# Patient Record
Sex: FEMALE | Race: OTHER
[De-identification: names, ages, dates, MRNs, and addresses within clinical notes are randomized per-mention and may not be internally consistent; named-entity substitution may affect disease eponyms.]

---

## 2017-09-01 ENCOUNTER — HOSPITAL ENCOUNTER (EMERGENCY)
Dept: HOSPITAL 74 - JERFT | Age: 72
Discharge: HOME | End: 2017-09-01
Payer: COMMERCIAL

## 2017-09-01 VITALS — BODY MASS INDEX: 27 KG/M2

## 2017-09-01 VITALS — SYSTOLIC BLOOD PRESSURE: 149 MMHG | DIASTOLIC BLOOD PRESSURE: 79 MMHG | HEART RATE: 68 BPM | TEMPERATURE: 98.4 F

## 2017-09-01 DIAGNOSIS — E78.00: ICD-10-CM

## 2017-09-01 DIAGNOSIS — I10: ICD-10-CM

## 2017-09-01 DIAGNOSIS — J40: Primary | ICD-10-CM

## 2017-09-01 NOTE — PDOC
History of Present Illness





- General


Chief Complaint: Ear Problem


Stated Complaint: SORE THROAT, RT EAR PAIN


Time Seen by Provider: 09/01/17 15:09


History Source: Patient


Exam Limitations: No Limitations





- History of Present Illness


Initial Comments: 


09/01/17 15:09


CHIEF COMPLAINT: Throat pain





HISTORY OF PRESENT ILLNESS: This is a 72 year old female with a history of HTN 

and hypercholesterolemia who presents for evaluation of right ear pain, throat 

pain, dry cough, bilateral eye redness/itching/discharge, and nasal congestion. 

Symptoms started about one week ago after returning from [place]. Her  is 

being seen here with similar symptoms. She denies fevers/chills, chest pain, 

shortness of breath, or any other symptoms.





V/s on arrival are unremarkable.





Past History





- Travel


Traveled outside of the country in the last 30 days: Yes


If so, where?: [facility]





- Past Medical History


Allergies/Adverse Reactions: 


 Allergies











Allergy/AdvReac Type Severity Reaction Status Date / Time


 


naproxen [From Naprosyn] Allergy   Verified 09/01/17 13:59











Home Medications: 


Ambulatory Orders





Aspirin [Aspirin EC] 81 mg PO DAILY 12/06/16 


Atorvastatin Ca [Lipitor] 20 mg PO HS 12/06/16 


Calcium Carbonate/Vitamin D3 [Calcium 600 + Vit D Tablet] 1 each PO DAILY 12/06/ 16 


Cholecalciferol (Vitamin D3) [Vitamin D3 -] 50,000 unit PO DAILY 12/06/16 


Lisinopril [Prinivil] 10 mg PO DAILY 12/06/16 


Metoprolol Succinate [Toprol Xl -] 50 mg PO DAILY 12/06/16 


Omeprazole 20 mg PO PRN PRN 12/06/16 


Azithromycin [Zithromax Tri-Mando (3 DAYS) -] 500 mg PO DAILY #3 tablet 09/01/17 








HTN: Yes


Hypercholesterolemia: Yes


Suicide Attempt (Hx): No





- Surgical History


Abdominal Surgery: Yes


Appendectomy: Yes


Cholecystectomy: Yes





- Psycho/Social/Smoking Cessation Hx


Anxiety: No


Suicidal Ideation: No


Smoking Status: No


Smoking History: Never smoked


Number of Cigarettes Smoked Daily: 0


Information on smoking cessation initiated: No


Hx Alcohol Use: No


Drug/Substance Use Hx: No


Substance Use Type: None





**Review of Systems





- Review of Systems


Constitutional: No: Chills, Fever


HEENTM: Yes: Ear Pain (right), Nose Congestion, Throat Pain


Respiratory: Yes: Cough (dry)


Cardiac (ROS): No: Chest Pain, Edema, Palpitations


ABD/GI: No: Symptoms Reported


: No: Symptoms Reported


Neurological: No: Symptoms reported





*Physical Exam





- Vital Signs


 Last Vital Signs











Temp Pulse Resp BP Pulse Ox


 


 98.4 F   68   17   149/79   97 


 


 09/01/17 13:57  09/01/17 13:57  09/01/17 13:57  09/01/17 13:57  09/01/17 13:57














- Physical Exam


General Appearance: Yes: Nourished, Appropriately Dressed


HEENT: positive: EOMI, Pharyngeal Erythema, Nasal Congestion, Rhinorrhea, Sinus 

Tenderness, TM Dull (right), Other (conjunctivae injected bilaterally)


Respiratory/Chest: positive: Lungs Clear, Normal Breath Sounds


Cardiovascular: positive: Regular Rhythm, Regular Rate


Neurologic: positive: CNs II-XII NML intact





Medical Decision Making





- Medical Decision Making


09/01/17 15:36


A/P: 72 year old female with multiple symptoms including ear pain, throat pain, 

nasal congestion, bilateral eye redness and discharge, and cough.





1. Flu swab


2. Rapid strep


3. Tylenol #3 for pain/cough


4. Sudafed for congestion











*DC/Admit/Observation/Transfer


Diagnosis at time of Disposition: 


 Bronchitis





- Discharge Dispostion


Disposition: HOME


Condition at time of disposition: Stable


Admit: No





- Prescriptions


Prescriptions: 


Azithromycin [Zithromax Tri-Mando (3 DAYS) -] 500 mg PO DAILY #3 tablet





- Patient Instructions


Printed Discharge Instructions:  DI for Acute Bronchitis


Additional Instructions: 


-Take antibiotics as prescribed.


-Use Tobramycin drops: 1 in each eye every 4 hours. Wash sheets and pillowcases 

in hot water. Discard any eye makeup.


-Use over-the-counter Sudafed as needed for congestion and Robitussin as needed 

for cough.


-Follow up with your primary care doctor. Return here for difficulty breathing 

or any other concerning symptoms.

## 2017-09-05 ENCOUNTER — HOSPITAL ENCOUNTER (EMERGENCY)
Dept: HOSPITAL 74 - JERFT | Age: 72
Discharge: HOME | End: 2017-09-05
Payer: COMMERCIAL

## 2017-09-05 VITALS — TEMPERATURE: 98.4 F | SYSTOLIC BLOOD PRESSURE: 145 MMHG | DIASTOLIC BLOOD PRESSURE: 72 MMHG | HEART RATE: 70 BPM

## 2017-09-05 VITALS — BODY MASS INDEX: 26.6 KG/M2

## 2017-09-05 DIAGNOSIS — E78.00: ICD-10-CM

## 2017-09-05 DIAGNOSIS — I10: ICD-10-CM

## 2017-09-05 DIAGNOSIS — J40: Primary | ICD-10-CM

## 2017-09-05 NOTE — PDOC
History of Present Illness





- General


Chief Complaint: Cold Symptoms


Stated Complaint: cough, wants new prescription


Time Seen by Provider: 09/05/17 09:52


History Source: Patient


Exam Limitations: No Limitations





- History of Present Illness


Initial Comments: 


09/05/17 10:50


Patient is a 72-year-old female, history of hypertension, was seen in the 

emergency department on 09/01/2017 for sore throat and cough patient was given 

a azithromycin 500 mg by mouth for 3 days with robitussin, states that symptoms 

are still persisting unable to sleep at night because of cough. Reports that 

symptoms started after returning from hospitals,  Cough is productive with green 

sputum. Patient denies any fever, no nausea vomiting, no constipation or 

diarrhea.  with similar symptoms.  





Past Medical History: Denies.  





Allergies: No known allergies





Medications: Azithromycin





Family History: Non-contributory





Social History: Denies smoking, alcohol use, or IVDU





Review of Systems


GENERAL/CONSTITUTIONAL: No fever or chills. No weakness. No weight change.


HEAD, EYES, EARS, NOSE AND THROAT: No change in vision. No ear pain or 

discharge. No sore throat. 


CARDIOVASCULAR: No chest pain or shortness of breath.


RESPIRATORY: +Cough, no wheezing, or hemoptysis.


GASTROINTESTINAL: No nausea, vomiting, diarrhea or constipation. No rectal 

bleeding.


GENITOURINARY: No dysuria, frequency, or change in urination.


MUSCULOSKELETAL: No joint or muscle swelling or pain. No neck or back pain.


SKIN : No rash or easy bruising.


NEUROLOGIC: No headache, vertigo, loss of consciousness, or loss of sensation.


PSYCHIATRIC: No depression or anxiety.


ENDOCRINE: No increased thirst. No abnormal weight change.


HEMATOLOGIC/LYMPHATIC: No anemia, easy bleeding, or history of blood clots.


ALLERGIC/IMMUNOLOGIC: No hives or skin allergy. No latex allergy.





Physical Exam: 


GENERAL: The patient is awake, alert, and fully oriented, in no acute distress.


EYES: Pupils equal, round and reactive to light, extraocular movements intact, 

sclera anicteric, conjunctiva clear.


ENT: Ears normal, nares patent, oropharynx clear without exudates.  Moist 

mucous membranes. No uvula deviation


NECK: Normal range of motion, supple without lymphadenopathy, JVD, or masses.


LUNGS: Breath sounds equal, clear to auscultation bilaterally.   No wheezes, 

and no crackles.


HEART: Regular rate and rhythm, normal S1 and S2 without murmur, rub or gallop.


ABDOMEN: Soft, nontender, normoactive bowel sounds.  No guarding, no rebound.  

No masses. No bruising or abrasions


MUSCULOSKELETAL: Normal range of motion, no edema.  No clubbing or cyanosis. No 

cords, erythema, or tenderness. No CVA Tenderness with fist.


NEUROLOGICAL: Cranial nerves II through XII grossly intact.  Normal speech, 

normal gait.


SKIN: Warm, Dry, normal turgor, no rashes or lesions noted.














Past History





- Past Medical History


Allergies/Adverse Reactions: 


 Allergies











Allergy/AdvReac Type Severity Reaction Status Date / Time


 


naproxen [From Naprosyn] Allergy   Verified 09/05/17 09:35











Home Medications: 


Ambulatory Orders





Aspirin [Aspirin EC] 81 mg PO DAILY 12/06/16 


Atorvastatin Ca [Lipitor] 20 mg PO HS 12/06/16 


Calcium Carbonate/Vitamin D3 [Calcium 600 + Vit D Tablet] 1 each PO DAILY 12/06/ 16 


Cholecalciferol (Vitamin D3) [Vitamin D3 -] 50,000 unit PO DAILY 12/06/16 


Lisinopril [Prinivil] 10 mg PO DAILY 12/06/16 


Metoprolol Succinate [Toprol Xl -] 50 mg PO DAILY 12/06/16 


Omeprazole 20 mg PO PRN PRN 12/06/16 


Azithromycin [Zithromax Tri-Mando (3 DAYS) -] 500 mg PO DAILY #3 tablet 09/01/17 








HTN: Yes


Hypercholesterolemia: Yes


Suicide Attempt (Hx): No





- Surgical History


Abdominal Surgery: Yes


Appendectomy: Yes


Cholecystectomy: Yes





- Psycho/Social/Smoking Cessation Hx


Anxiety: No


Suicidal Ideation: No


Smoking Status: No


Smoking History: Never smoked


Number of Cigarettes Smoked Daily: 0


Information on smoking cessation initiated: No


Hx Alcohol Use: No


Drug/Substance Use Hx: No


Substance Use Type: None





*Physical Exam





- Vital Signs


 Last Vital Signs











Temp Pulse Resp BP Pulse Ox


 


 98.4 F   70   18   145/72   99 


 


 09/05/17 09:33  09/05/17 09:33  09/05/17 09:33  09/05/17 09:33  09/05/17 09:33














ED Treatment Course





- RADIOLOGY


Radiology Studies Ordered: 














 Category Date Time Status


 


 CHEST PA & LAT [RAD] Stat Radiology  09/05/17 10:11 Completed














Medical Decision Making





- Medical Decision Making


09/05/17 10:59


A/P: Patient with symptoms of viral illness, will DC patient home to continue 

over-the-counter cough medicine follow-up with PMD after 7 days of initial 

onset. Chest x-ray was performed and is negative for pneumonia. Instructions 

given to patient, she verbalized understanding will follow-up as instructed if 

symptoms persist.





I discussed the physical exam findings, ancillary test results and final 

diagnoses with the patient. I answered all of the patient's questions.


The patient was satisfied with the care received and felt comfortable with the 

discharge plan and treatment plan. 


The patient will call to arrange follow-up and will return to the Emergency 

Department with any new, persistent or worsening symptoms. 








*DC/Admit/Observation/Transfer


Diagnosis at time of Disposition: 


 Bronchitis





- Discharge Dispostion


Disposition: HOME


Condition at time of disposition: Good


Admit: No





- Referrals


Referrals: 


Quinn Morales [Primary Care Provider] - 





- Patient Instructions


Printed Discharge Instructions:  DI for Viral Upper Respiratory Infection -- 

Adult


Additional Instructions: 


Keep head of bed elevated 45 when sleeping


Cough medicine


Cool air humidifier


Followup in the primary care doctor's office if symptoms persist after 7 days.


If any respiratory distress, increased cough, inability to drink, increased 

wheezing please return immediately to emergency department.

## 2017-09-08 ENCOUNTER — HOSPITAL ENCOUNTER (EMERGENCY)
Dept: HOSPITAL 74 - JERFT | Age: 72
Discharge: HOME | End: 2017-09-08
Payer: COMMERCIAL

## 2017-09-08 VITALS — DIASTOLIC BLOOD PRESSURE: 68 MMHG | TEMPERATURE: 98.1 F | HEART RATE: 71 BPM | SYSTOLIC BLOOD PRESSURE: 117 MMHG

## 2017-09-08 VITALS — BODY MASS INDEX: 26.6 KG/M2

## 2017-09-08 DIAGNOSIS — R05: Primary | ICD-10-CM

## 2017-09-08 DIAGNOSIS — E78.00: ICD-10-CM

## 2017-09-08 NOTE — PDOC
History of Present Illness





- General


Chief Complaint: RX Refill


Stated Complaint: RX REFILL


Time Seen by Provider: 09/08/17 13:04


History Source: Patient


Exam Limitations: No Limitations





- History of Present Illness


Initial Comments: 





09/08/17 13:09


This is a 72-year-old woman with past medical history of hypercholesterolemia 

and hypertension who presents today with continued dry productive cough with 

yellow sputum. Her cough is worse in the morning and resolves throughout the 

day. She has completed the Z-Mando without any relief of cough or symptoms. She 

has an appointment with her primary doctor in October. Taking promethazine 

cough syrup with good relief. She is requesting a prescription for Phenergan 

cough syrup until she can see her primary doctor. She denies fevers, chest pain

, shortness of breath, nausea, vomiting, dizziness.


Tob- denies


ETOH- denies


Illicits- denies


Timing/Duration: reports: other





Past History





- Past Medical History


Allergies/Adverse Reactions: 


 Allergies











Allergy/AdvReac Type Severity Reaction Status Date / Time


 


naproxen [From Naprosyn] Allergy   Verified 09/08/17 12:24











Home Medications: 


Ambulatory Orders





Aspirin [Aspirin EC] 81 mg PO DAILY 12/06/16 


Atorvastatin Ca [Lipitor] 20 mg PO HS 12/06/16 


Calcium Carbonate/Vitamin D3 [Calcium 600 + Vit D Tablet] 1 each PO DAILY 12/06/ 16 


Cholecalciferol (Vitamin D3) [Vitamin D3 -] 50,000 unit PO DAILY 12/06/16 


Lisinopril [Prinivil] 10 mg PO DAILY 12/06/16 


Metoprolol Succinate [Toprol Xl -] 50 mg PO DAILY 12/06/16 


Omeprazole 20 mg PO PRN PRN 12/06/16 


Phenylephrine HCl/Prometh HCl [Promethazine Vc Syrup] 10 ml PO BID #400 syrup 09 /08/17 








HTN: Yes


Hypercholesterolemia: Yes


Suicide Attempt (Hx): No





- Surgical History


Abdominal Surgery: Yes


Appendectomy: Yes


Cholecystectomy: Yes





- Psycho/Social/Smoking Cessation Hx


Anxiety: No


Suicidal Ideation: No


Smoking Status: No


Smoking History: Never smoked


Number of Cigarettes Smoked Daily: 0


Information on smoking cessation initiated: No


Hx Alcohol Use: No


Drug/Substance Use Hx: No


Substance Use Type: None





**Review of Systems





- Review of Systems


Able to Perform ROS?: Yes


Is the patient limited English proficient: No


Constitutional: No: Symptoms Reported


HEENTM: No: Symptoms Reported


Respiratory: Yes: See HPI


Cardiac (ROS): No: Symptoms Reported


ABD/GI: No: Symptoms Reported


: No: Symptoms Reported


Musculoskeletal: No: Symptoms Reported


Integumentary: No: Symptoms Reported


Neurological: No: Symptoms reported





*Physical Exam





- Vital Signs


 Last Vital Signs











Temp Pulse Resp BP Pulse Ox


 


 98.1 F   71   19   117/68   99 


 


 09/08/17 12:22  09/08/17 12:22 09/08/17 12:22 09/08/17 12:22 09/08/17 12:22














- Physical Exam


General Appearance: Yes: Appropriately Dressed.  No: Apparent Distress


HEENT: positive: EOMI, CAMACHO, Normal ENT Inspection


Neck: positive: Trachea midline.  negative: Supple


Respiratory/Chest: positive: Lungs Clear, Normal Breath Sounds.  negative: 

Chest Tender, Respiratory Distress, Accessory Muscle Use


Cardiovascular: positive: Regular Rhythm, Regular Rate, S1, S2


Gastrointestinal/Abdominal: positive: Normal Bowel Sounds, Soft.  negative: 

Tender


Musculoskeletal: positive: Normal Inspection.  negative: CVA Tenderness


Extremity: positive: Normal Capillary Refill


Integumentary: positive: Normal Color, Dry, Warm


Neurologic: positive: CNs II-XII NML intact, Fully Oriented, Alert, Normal Mood/

Affect, Normal Response, Motor Strength 5/5





Medical Decision Making





- Medical Decision Making





09/08/17 13:15


A:


This is a 72-year-old woman with past medical history of hypercholesterolemia 

and hypertension who presents today with continued dry productive cough with 

yellow sputum. Her cough is worse in the morning and resolves throughout the 

day. She has completed the Z-Mando without any relief of cough or symptoms. She 

has an appointment with her primary doctor in October. Taking promethazine 

cough syrup with good relief. She is requesting a prescription for Phenergan 

cough syrup until she can see her primary doctor. She denies fevers, chest pain

, shortness of breath, nausea, vomiting, dizziness. She is refusing chest x-ray 

and complete workup. Lungs clear to auscultation bilaterally speaking full 

sentences no wheezes.





P:


Will prescribe phenergan syrup with strict follow-up and return instructions.


I discussed the physical exam findings, ancillary test results and final 

diagnoses with the patient. I answered all of the patient's questions.


The patient was satisfied with the care received and felt comfortable with the 

discharge plan and treatment plan. 


The patient will call her primary doctor within 72 hours to arrange follow-up 

and will return to the Emergency Department with any new, persistant or 

worsening symptoms. 





*DC/Admit/Observation/Transfer


Diagnosis at time of Disposition: 


 Cough in adult





- Discharge Dispostion


Disposition: HOME


Condition at time of disposition: Stable


Admit: No





- Prescriptions


Prescriptions: 


Phenylephrine HCl/Prometh HCl [Promethazine Vc Syrup] 10 ml PO BID #400 syrup





- Patient Instructions


Printed Discharge Instructions:  DI for Cough -- Adult

## 2018-05-18 ENCOUNTER — HOSPITAL ENCOUNTER (EMERGENCY)
Dept: HOSPITAL 74 - JERFT | Age: 73
Discharge: HOME | End: 2018-05-18
Payer: COMMERCIAL

## 2018-05-18 VITALS — TEMPERATURE: 97.8 F

## 2018-05-18 VITALS — DIASTOLIC BLOOD PRESSURE: 70 MMHG | SYSTOLIC BLOOD PRESSURE: 131 MMHG | HEART RATE: 84 BPM

## 2018-05-18 DIAGNOSIS — I10: ICD-10-CM

## 2018-05-18 DIAGNOSIS — E78.00: ICD-10-CM

## 2018-05-18 DIAGNOSIS — B34.9: Primary | ICD-10-CM

## 2018-05-18 NOTE — PDOC
History of Present Illness





- General


Chief Complaint: Cold Symptoms


Stated Complaint: COLD LIKE SYMPTOMS


Time Seen by Provider: 05/18/18 11:46


History Source: Patient





- History of Present Illness


Timing/Duration: reports: yesterday


Severity: reports: moderate


Associated Symptoms: reports: cough, earache, fever/chills.  denies: muscle 

aches





Past History





- Past Medical History


Allergies/Adverse Reactions: 


 Allergies











Allergy/AdvReac Type Severity Reaction Status Date / Time


 


naproxen [From Naprosyn] Allergy   Verified 05/18/18 10:48











Home Medications: 


Ambulatory Orders





Aspirin [Aspirin EC] 81 mg PO DAILY 12/06/16 


Atorvastatin Ca [Lipitor] 20 mg PO HS 12/06/16 


Calcium Carbonate/Vitamin D3 [Calcium 600 + Vit D Tablet] 1 each PO DAILY 12/06/ 16 


Cholecalciferol (Vitamin D3) [Vitamin D3 -] 50,000 unit PO DAILY 12/06/16 


Lisinopril [Prinivil] 10 mg PO DAILY 12/06/16 


Metoprolol Succinate [Toprol Xl -] 50 mg PO DAILY 12/06/16 


Omeprazole 20 mg PO PRN PRN 12/06/16 


Phenylephrine HCl/Prometh HCl [Promethazine Vc Syrup] 10 ml PO BID #400 syrup 09 /08/17 








HTN: Yes


Hypercholesterolemia: Yes





- Surgical History


Abdominal Surgery: Yes


Appendectomy: Yes


Cholecystectomy: Yes





- Suicide/Smoking/Psychosocial Hx


Smoking Status: No


Smoking History: Never smoked


Number of Cigarettes Smoked Daily: 0


Hx Alcohol Use: No


Drug/Substance Use Hx: No


Substance Use Type: None





**Review of Systems





- Review of Systems


Constitutional: Yes: Chills, Malaise.  No: Fever


HEENTM: Yes: Ear Pain.  No: Throat Pain


Respiratory: Yes: Cough.  No: Shortness of Breath


Cardiac (ROS): No: Chest Pain


ABD/GI: No: Diarrhea, Nausea, Vomiting





*Physical Exam





- Vital Signs


 Last Vital Signs











Temp Pulse Resp BP Pulse Ox


 


 97.8 F   101 H  28 H  88/59   99 


 


 05/18/18 11:38  05/18/18 11:38  05/18/18 11:38  05/18/18 11:38  05/18/18 11:38














- Physical Exam


General Appearance: Yes: Appropriately Dressed.  No: Apparent Distress


HEENT: positive: Normal Voice


Neck: positive: Supple


Respiratory/Chest: positive: Lungs Clear, Normal Breath Sounds.  negative: 

Respiratory Distress


Cardiovascular: positive: Regular Rate, S1, S2


Integumentary: positive: Dry, Warm


Neurologic: positive: Fully Oriented, Alert, Normal Mood/Affect





ED Treatment Course





- RADIOLOGY


Radiology Studies Ordered: 














 Category Date Time Status


 


 CHEST PA & LAT [RAD] Stat Radiology  05/18/18 11:47 Ordered














Medical Decision Making





- Medical Decision Making





05/18/18 11:54


72 yo F, denies any past medical history here with malaise with productive 

cough with right ear pain since last night.  + chills, no fever, sore throat, 

shortness of breath, chest pain, nausea, vomiting or diarrhea.  No known sick 

contacts.  No recent travel.  Patient tachypneic to 28 and mildly tachycardic 

to 101 at triage, but appears comfortable and in no apparent distress in ER 

with clear chest, lungs.  Suspect viral etiology at this time but will get 

chest x-ray. Rpt vitals








05/18/18 12:41


CXR negative for acute pathology. Rpt vitals wnl with no intervention. Stable 

for dc w/ supportive tx and pmd f/u as needed





*DC/Admit/Observation/Transfer


Diagnosis at time of Disposition: 


 Viral syndrome








- Discharge Dispostion


Disposition: HOME


Condition at time of disposition: Good





- Referrals





- Patient Instructions


Printed Discharge Instructions:  DI for Viral Syndrome


Additional Instructions: 


Your cxr was negative for any infection.  The cause of your symptoms is most 

likely viral.  Rest and plenty of fluids and take Tylenol as needed for pain





- Post Discharge Activity

## 2018-07-15 ENCOUNTER — HOSPITAL ENCOUNTER (EMERGENCY)
Dept: HOSPITAL 74 - JER | Age: 73
Discharge: HOME | End: 2018-07-15
Payer: COMMERCIAL

## 2018-07-15 VITALS — BODY MASS INDEX: 44.4 KG/M2

## 2018-07-15 VITALS — TEMPERATURE: 98.2 F | SYSTOLIC BLOOD PRESSURE: 146 MMHG | HEART RATE: 56 BPM | DIASTOLIC BLOOD PRESSURE: 60 MMHG

## 2018-07-15 DIAGNOSIS — M25.552: Primary | ICD-10-CM

## 2018-07-15 NOTE — PDOC
History of Present Illness





- General


Chief Complaint: Pain


Stated Complaint: PAIN


Time Seen by Provider: 07/15/18 17:20


History Source: Patient, Spouse


Exam Limitations: Language Barrier





- History of Present Illness


Initial Comments: 





07/15/18 17:37


HPI:


*Patient's  translated


Patient is a 73 year old  female with no significant PMH presenting to 

ED [facility] and for L hip pain radiating 

down to the calf. Patient states the pain started 1 month ago when she was in 

[facility]. Patient returned yesterday. Pain is worse when she walks, gets better 

with rest. She was given medications for the pain which does help, but the pain 

has not gone away. Patient rates the pain 6/10. She feels weaker on the L side 

compared to the R. She denies injury, fever, chills, SOB, chest pain, abdominal 

pain, N/V/D, numbness/tingling, swelling. Denies history of blood clots





PMH: As per HPI.


PSH: None


Meds:


Allergies: Naproxen











Past History





- Travel


If so, where?: [facility]





- Past Medical History


Allergies/Adverse Reactions: 


 Allergies











Allergy/AdvReac Type Severity Reaction Status Date / Time


 


naproxen [From Naprosyn] Allergy   Verified 07/15/18 17:00











Home Medications: 


Ambulatory Orders





NK [No Known Home Medication]  07/15/18 








HTN: Yes


Hypercholesterolemia: Yes





- Surgical History


Abdominal Surgery: Yes


Appendectomy: Yes


Cholecystectomy: Yes





- Suicide/Smoking/Psychosocial Hx


Smoking Status: No


Smoking History: Never smoked


Have you smoked in the past 12 months: No


Number of Cigarettes Smoked Daily: 0


Information on smoking cessation initiated: No


Hx Alcohol Use: No


Drug/Substance Use Hx: No


Substance Use Type: None





**Review of Systems





- Review of Systems


Able to Perform ROS?: Yes


Comments:: 





07/15/18 17:45


*Patient's  translated


ROS:


Constitutional: No fevers, chills, fatigue, malaise


HEENT: No Rhinorrhea, nasal congestion, visual changes


Cardiovascular: No chest pain, syncope, palpitations, lightheadedness


Respiratory: No Cough, SOB, Hemoptysis,


Gastrointestinal: No Abdominal pain, Nausea, Vomiting, Constipation, Diarrhea, 

Melena


Genitourinary: No Dysuria


Musculoskeletal: L hip pain radiating down to calf


Neurologic: L leg weakness. No Headache, Dizziness, Numbness, Tingling


07/15/18 18:16








*Physical Exam





- Vital Signs


 Last Vital Signs











Temp Pulse Resp BP Pulse Ox


 


 98.7 F   61   18   130/86   98 


 


 07/15/18 16:59  07/15/18 16:59  07/15/18 16:59  07/15/18 16:59  07/15/18 16:59














- Physical Exam


Comments: 





07/15/18 17:50


Physical Exam:


General Appearance: Patient sitting comfortably in chair. No Apparent Distress


HEENT: EOMI, CAMACHO. No Pharyngeal Erythema, Tonsillar Exudate, Tonsillar Erythema


Respiratory/Chest: Lungs Clear, Normal Breath Sounds. No Crackles, Rales, 

Rhonchi, Wheezing


Cardiovascular: Regular Rhythm, Regular Rate, Peripheral pulses palpable 

bilaterally. No JVD, Murmur, Gallops, Rubs


Gastrointestinal/Abdominal: Normal Bowel Sounds, Soft. No Guarding, Rebound, 

Tenderness


Musculoskeletal: L hip pain upon palpation of ischial spine. No left leg 

tenderness, no joint swelling, no calf erythema or edema. No leg length 

discrepancy. No CVA Tenderness


Extremity: Normal Capillary Refill


Integumentary: Normal Color, Dry, Warm


Neurologic: CNs II-XII NML intact, Fully Oriented, Alert, Normal Mood/Affect, 

Normal Response, Motor Strength 5/5. Patellar reflex intact. Negative straight 

leg raise on R and L side. 





07/15/18 18:25








Medical Decision Making





- Medical Decision Making





07/15/18 18:22


Patient is a 73 year old female with no significant PMH presenting to ED with L 

hip pain radiating down to the L calf x 1month. No injury, numbness, tingling, 

swelling, erythema, muscle tenderness. Hip is tender. 





DDX: osteonecrosis, occult hip fracture, rchanteric bursitis, sciatica, 





Patient is afebrile, normotensive and HR is wnl. No concern for infectious 

process.





Most likely discharge home in stable condition. Xray results pending.


Patient signed out to Dr. Davenport





07/15/18 18:56








*DC/Admit/Observation/Transfer


Diagnosis at time of Disposition: 


Hip pain


Qualifiers:


 Laterality: left Qualified Code(s): M25.552 - Pain in left hip








- Discharge Dispostion


Disposition: HOME


Condition at time of disposition: Stable





- Referrals


Referrals: 


Bianca Uribe MD [Primary Care Provider] - 





- Patient Instructions


Additional Instructions: 


You were seen here today for left hip pain. We gave you Tylenol and a Lidoderm 

patch in the emergency department to help with pain. You can keep taking 

Tylenol for pain control. 


The Xray of your hip showed PENDING


Please come back to the emergency department if your pain gets worse, if you 

fall, or if you develop a new symptom. 


Please follow up with your primary care doctor.


Thank you





- Post Discharge Activity

## 2018-07-15 NOTE — PDOC
*Physical Exam





- Vital Signs


 Last Vital Signs











Temp Pulse Resp BP Pulse Ox


 


 98.7 F   61   18   130/86   98 


 


 07/15/18 16:59  07/15/18 16:59  07/15/18 16:59  07/15/18 16:59  07/15/18 16:59














ED Treatment Course





- Medications


Given in the ED: 


ED Medications














Discontinued Medications














Generic Name Dose Route Start Last Admin





  Trade Name Zaida  PRN Reason Stop Dose Admin


 


Acetaminophen  0 mg  07/15/18 18:11  07/15/18 18:26





  Ofirmev Injection -  IVPB  07/15/18 18:12  Not Given





  ONCE ONE   





     





     





     





     


 


Acetaminophen  500 mg  07/15/18 18:19  07/15/18 18:27





  Tylenol -  PO  07/15/18 18:20  Not Given





  ONCE ONE   





     





     





     





     


 


Acetaminophen  650 mg  07/15/18 18:20  07/15/18 18:26





  Tylenol -  PO  07/15/18 18:21  650 mg





  ONCE ONE   Administration





     





     





     





     














Medical Decision Making





- Medical Decision Making


Pt signed out by Dr. Barnes during shift change.


You were seen here today for left hip pain. We gave you Tylenol and a Lidoderm 

patch in the emergency department to help with pain. You can keep taking 

Tylenol for pain control. 


The Xray of your hip was negative as read by Dr. Townsend.


Please come back to the emergency department if your pain gets worse, if you 

fall, or if you develop a new symptom. 


Please follow up with your primary care doctor.


Thank you


07/15/18 18:58


Pt x-ray read as negative by Dr. Townsend. Possible calcification near R iliac 

crest. Pt can follow-up with orthopedics if concerned. 


07/15/18 20:59








*DC/Admit/Observation/Transfer


Diagnosis at time of Disposition: 


Hip pain


Qualifiers:


 Laterality: left Qualified Code(s): M25.552 - Pain in left hip








- Discharge Dispostion


Disposition: HOME


Condition at time of disposition: Stable


Decision to Admit order: No





- Referrals


Referrals: 


Bianca Uribe MD [Primary Care Provider] - 


Ernie Kent MD [Staff Physician] - 





- Patient Instructions


Additional Instructions: 


You were seen here today for left hip pain. We gave you Tylenol and a Lidoderm 

patch in the emergency department to help with pain. You can keep taking 

Tylenol for pain control. 


The Xray of your hip did not show any fractures. 


Please come back to the emergency department if your pain gets worse, if you 

fall, or if you develop a new symptom. 


Please follow up with your primary care doctor and the orthopedic clinic if 

pain persists.


Thank you





- Post Discharge Activity

## 2019-01-06 ENCOUNTER — HOSPITAL ENCOUNTER (EMERGENCY)
Dept: HOSPITAL 74 - JERFT | Age: 74
Discharge: HOME | End: 2019-01-06
Payer: COMMERCIAL

## 2019-01-06 VITALS — SYSTOLIC BLOOD PRESSURE: 157 MMHG | DIASTOLIC BLOOD PRESSURE: 75 MMHG | HEART RATE: 72 BPM | TEMPERATURE: 97.8 F

## 2019-01-06 VITALS — BODY MASS INDEX: 27.6 KG/M2

## 2019-01-06 DIAGNOSIS — E78.00: ICD-10-CM

## 2019-01-06 DIAGNOSIS — I10: ICD-10-CM

## 2019-01-06 DIAGNOSIS — M54.42: Primary | ICD-10-CM

## 2019-01-06 NOTE — PDOC
History of Present Illness





- General


Chief Complaint: Back Pain


Stated Complaint: PAIN


Time Seen by Provider: 01/06/19 08:30


History Source: Patient


Exam Limitations: No Limitations





- History of Present Illness


Initial Comments: 





01/06/19 08:58


Pt is a 72 y/o F who presents to the ED with low back pain radiating down to 

her L leg for the past 3 weeks. Pt states she has a hx of sciatica with her 

last flare in 7/18. She states that her symptoms came on after walking. Her 

 tried to massage her spasm, and she states that pain got worse after 

that. She is not taking any medication at home for her pain. Denies fevers, 

chills, pain with urination, frequency, urgency, hematuria, nausea, vomiting, 

weakness to the extremities, numbness to the extremities, bladder/bowel 

incontinence and saddle anesthesia. 





Past History





- Travel


Traveled outside of the country in the last 30 days: No


Close contact w/someone who was outside of country & ill: No





- Past Medical History


Allergies/Adverse Reactions: 


 Allergies











Allergy/AdvReac Type Severity Reaction Status Date / Time


 


naproxen [From Naprosyn] Allergy   Verified 01/06/19 08:25











Home Medications: 


Ambulatory Orders





Cyclobenzaprine HCl 5 mg PO HS #10 tablet 01/06/19 


Lidocaine 5% Patch [Lidoderm -] 1 patch TP DAILY #7 patch 01/06/19 


Methylprednisolone [Medrol Dose Mando] 4 mg PO ASDIR #21 tablet 01/06/19 








COPD: No


HTN: Yes


Hypercholesterolemia: Yes





- Surgical History


Abdominal Surgery: Yes


Appendectomy: Yes


Cholecystectomy: Yes





- Suicide/Smoking/Psychosocial Hx


Smoking Status: No


Smoking History: Never smoked


Have you smoked in the past 12 months: No


Number of Cigarettes Smoked Daily: 0


Information on smoking cessation initiated: No


Hx Alcohol Use: No


Drug/Substance Use Hx: No


Substance Use Type: None





**Review of Systems





- Review of Systems


Able to Perform ROS?: Yes


Comments:: 





01/06/19 08:57


CONSTITUTIONAL: 


Absent: fever, chills, diaphoresis, generalized weakness, malaise, loss of 

appetite


GASTROINTESTINAL:


Absent: abdominal pain, abdominal distension, nausea, vomiting, diarrhea, 

constipation, melena, hematochezia


GENITOURINARY: 


Absent: dysuria, frequency, urgency, hesitancy, hematuria, flank pain, genital 

pain


MUSCULOSKELETAL: 


Present: low back pain Absent: arthralgia, joint swelling


SKIN: 


Absent: rash, itching, pallor


NEUROLOGIC: 


Absent: headache, focal weakness or paresthesias, dizziness, unsteady gait, 

seizure, mental status changes, bladder or bowel incontinence


PSYCHIATRIC: 


Absent: anxiety, depression, suicidal or homicidal ideation, hallucinations.


Is the patient limited English proficient: No





*Physical Exam





- Vital Signs


 Last Vital Signs











Temp Pulse Resp BP Pulse Ox


 


 97.8 F   72   18   157/75   96 


 


 01/06/19 08:26  01/06/19 08:26  01/06/19 08:26  01/06/19 08:26  01/06/19 08:26














- Physical Exam


Comments: 





01/06/19 08:57


GENERAL:


Well developed, well nourished. Awake and alert. No acute distress.


NECK: 


Supple. Full ROM. No JVD. Carotid pulses 2+ and symmetric, without bruits. No 

thyromegaly. No lymphadenopathy.


MUSCULOSKELETAL 


TTP of the L paraspinous muscles, L2-S1, with palpable knot consistent with 

muscle spasm. (+) straight leg raise on the left. Decreased ROM of the back d.t 

pain. No midline tenderness. Calves and thighs are soft and with out pain. 

Normal range of motion at all other joints. No bony deformities or tenderness. 

No CVA tenderness.


EXTREMITIES: 


No cyanosis. No clubbing. No edema. No calf tenderness.


SKIN: 


Warm and dry. Normal capillary refill. No rashes. No jaundice. 


NEUROLOGICAL: 


Alert, awake, appropriate. Cranial nerves 2-12 intact. No deficits to light 

touch and temperature in face, upper extremities and lower extremities. No 

motor deficits in the in face, upper extremities and lower extremities. 

Normoreflexic in the upper and lower extremities. Normal speech. Toes are down-

going bilaterally. Gait is normal without ataxia.


PSYCHIATRIC: 


Cooperative. Good eye contact. Appropriate mood and affect.








Moderate Sedation





- Procedure Monitoring


Vital Signs: 


Procedure Monitoring Vital Signs











Temperature  97.8 F   01/06/19 08:26


 


Pulse Rate  72   01/06/19 08:26


 


Respiratory Rate  18   01/06/19 08:26


 


Blood Pressure  157/75   01/06/19 08:26


 


O2 Sat by Pulse Oximetry (%)  96   01/06/19 08:26











Medical Decision Making





- Medical Decision Making





01/06/19 08:57


Pt is a 72 y/o F who presents to the ED with low back pain radiating down to 

her leg for the past 3 weeks. Pt states she has a hx of sciatica. 





-Pt with TTP of the L paraspinous muscles, L2-S1, with palpable knot consistent 

with muscle spasm. (+) straight leg raise on the left.


-No trauma, or fever. No saddle anesthesia or bladder/bowel incontinence. No 

CVA tenderness or rash.


-Pt is neurologically intact on exam with no focal findings. 


-Tramadol given with relief of symptoms


-DC home. Ortho follow up given for if symptoms do not resolve.


-I discussed the physical exam findings, ancillary test results and final 

diagnoses with the patient. I answered all of the patient's questions. The 

patient was satisfied with the care received and felt comfortable with the 

discharge plan and treatment plan.  The Patient agrees to follow up with the 

primary care physician/specialist within 24-72 hours. Return precautions were 

given.








*DC/Admit/Observation/Transfer


Diagnosis at time of Disposition: 


Left low back pain


Qualifiers:


 Chronicity: acute Sciatica presence: with sciatica Sciatica laterality: 

sciatica of left side Qualified Code(s): M54.42 - Lumbago with sciatica, left 

side








- Discharge Dispostion


Disposition: HOME


Condition at time of disposition: Stable


Decision to Admit order: No





- Prescriptions


Prescriptions: 


Cyclobenzaprine HCl 5 mg PO HS #10 tablet


Methylprednisolone [Medrol Dose Mando] 4 mg PO ASDIR #21 tablet





- Referrals


Referrals: 


Bianca Uribe MD [Primary Care Provider] - 


Jluis Palacios MD, FAANS [Staff Physician] - 





- Patient Instructions


Printed Discharge Instructions:  DI for Back Pain With Sciatica


Additional Instructions: 


You have low back pain and sciatica due to a muscle spasm. 


Please take the prednisone as directed.


You were also prescribed Flexeril. Please take this medication every 8 hours 

for the first day. Then take the medication before you go to bed. Do not drive 

after taking this medication as it may make you sleepy. 


You may use warm compresses on your back to help with your symptoms. 


Please follow-up with your primary care doctor. 


If your symptoms do not resolve in 3-5 days, follow-up with orthopedics. A 

referral has been provided for you.





Return to the emergency department if you have worsening back pain, bladder or 

bowel incontinence, numbness and tingling in her legs, changes in the way you 

walk, or any new or worsening symptoms.





Tiene dolor lumbar y citica debido a un espasmo muscular.


Por favor tome la prednisona segn las indicaciones.


Tambin te recetaron Flexeril. [place] [facility] medicamento cada 8 horas jaymie el 

primer da. Luego tome el medicamento antes de irse a la cama. No maneje 

despus de [facility] medicamento, ya que puede causarle sueo.


Puede usar compresas tibias en la espalda para ayudar con annette sntomas.


Por favor ben un seguimiento con ayala mdico de atencin primaria.


Si annette sntomas no se resuelven en 3-5 olivarez, ben un seguimiento con ortopedia. 

Se ha proporcionado tegan referencia para usted.





Regrese a la marcie de emergencias si tiene empeoramiento del dolor de espalda, 

incontinencia de la vejiga o el intestino, entumecimiento y hormigueo en las 

piernas, cambios en la forma en que camina, o cualquier sntoma nuevo o que 

empeora.


Print Language: [place]





- Post Discharge Activity

## 2021-04-07 ENCOUNTER — HOSPITAL ENCOUNTER (OUTPATIENT)
Dept: HOSPITAL 74 - JRADIR | Age: 76
Discharge: HOME | End: 2021-04-07
Attending: INTERNAL MEDICINE
Payer: COMMERCIAL

## 2021-04-07 DIAGNOSIS — E04.1: Primary | ICD-10-CM

## 2021-04-07 PROCEDURE — 0G9K3ZX DRAINAGE OF THYROID GLAND, PERCUTANEOUS APPROACH, DIAGNOSTIC: ICD-10-PCS | Performed by: RADIOLOGY

## 2021-05-30 ENCOUNTER — HOSPITAL ENCOUNTER (EMERGENCY)
Dept: HOSPITAL 74 - JER | Age: 76
LOS: 1 days | Discharge: HOME | End: 2021-05-31
Payer: COMMERCIAL

## 2021-05-30 VITALS — HEART RATE: 77 BPM | DIASTOLIC BLOOD PRESSURE: 73 MMHG | TEMPERATURE: 98.6 F | SYSTOLIC BLOOD PRESSURE: 175 MMHG

## 2021-05-30 VITALS — BODY MASS INDEX: 31.4 KG/M2

## 2021-05-30 DIAGNOSIS — R04.0: Primary | ICD-10-CM

## 2021-05-30 PROCEDURE — 3E033GC INTRODUCTION OF OTHER THERAPEUTIC SUBSTANCE INTO PERIPHERAL VEIN, PERCUTANEOUS APPROACH: ICD-10-PCS | Performed by: EMERGENCY MEDICINE

## 2021-07-20 ENCOUNTER — HOSPITAL ENCOUNTER (EMERGENCY)
Dept: HOSPITAL 74 - JER | Age: 76
Discharge: HOME | End: 2021-07-20
Payer: COMMERCIAL

## 2021-07-20 VITALS — DIASTOLIC BLOOD PRESSURE: 77 MMHG | TEMPERATURE: 98.2 F | SYSTOLIC BLOOD PRESSURE: 146 MMHG | HEART RATE: 56 BPM

## 2021-07-20 VITALS — BODY MASS INDEX: 31.3 KG/M2

## 2021-07-20 DIAGNOSIS — R04.0: Primary | ICD-10-CM

## 2023-02-06 ENCOUNTER — HOSPITAL ENCOUNTER (OUTPATIENT)
Dept: HOSPITAL 74 - JER | Age: 78
Setting detail: OBSERVATION
LOS: 3 days | Discharge: HOME | End: 2023-02-09
Attending: FAMILY MEDICINE | Admitting: HOSPITALIST
Payer: COMMERCIAL

## 2023-02-06 VITALS — BODY MASS INDEX: 27.2 KG/M2

## 2023-02-06 DIAGNOSIS — K31.9: Primary | ICD-10-CM

## 2023-02-06 DIAGNOSIS — Z88.8: ICD-10-CM

## 2023-02-06 DIAGNOSIS — K59.00: ICD-10-CM

## 2023-02-06 DIAGNOSIS — E78.5: ICD-10-CM

## 2023-02-06 DIAGNOSIS — I10: ICD-10-CM

## 2023-02-06 DIAGNOSIS — R07.9: ICD-10-CM

## 2023-02-06 DIAGNOSIS — R10.13: ICD-10-CM

## 2023-02-06 LAB
ALBUMIN SERPL-MCNC: 4 G/DL (ref 3.4–5)
ALP SERPL-CCNC: 61 U/L (ref 45–117)
ALT SERPL-CCNC: 22 U/L (ref 13–61)
ANION GAP SERPL CALC-SCNC: 8 MMOL/L (ref 8–16)
AST SERPL-CCNC: 13 U/L (ref 15–37)
BASOPHILS # BLD: 0.2 % (ref 0–2)
BILIRUB SERPL-MCNC: 0.9 MG/DL (ref 0.2–1)
BUN SERPL-MCNC: 17.3 MG/DL (ref 7–18)
CALCIUM SERPL-MCNC: 9.6 MG/DL (ref 8.5–10.1)
CHLORIDE SERPL-SCNC: 99 MMOL/L (ref 98–107)
CO2 SERPL-SCNC: 26 MMOL/L (ref 21–32)
CREAT SERPL-MCNC: 1 MG/DL (ref 0.55–1.3)
DEPRECATED RDW RBC AUTO: 13.8 % (ref 11.6–15.6)
EOSINOPHIL # BLD: 0.4 % (ref 0–4.5)
GLUCOSE SERPL-MCNC: 141 MG/DL (ref 74–106)
HCT VFR BLD CALC: 39.6 % (ref 32.4–45.2)
HGB BLD-MCNC: 13.7 GM/DL (ref 10.7–15.3)
LIPASE SERPL-CCNC: 208 U/L (ref 73–393)
LYMPHOCYTES # BLD: 20.1 % (ref 8–40)
MCH RBC QN AUTO: 30.3 PG (ref 25.7–33.7)
MCHC RBC AUTO-ENTMCNC: 34.7 G/DL (ref 32–36)
MCV RBC: 87.6 FL (ref 80–96)
MONOCYTES # BLD AUTO: 7.5 % (ref 3.8–10.2)
NEUTROPHILS # BLD: 71.8 % (ref 42.8–82.8)
PLATELET # BLD AUTO: 278 10^3/UL (ref 134–434)
PMV BLD: 7.8 FL (ref 7.5–11.1)
PROT SERPL-MCNC: 7.7 G/DL (ref 6.4–8.2)
RBC # BLD AUTO: 4.53 M/MM3 (ref 3.6–5.2)
SODIUM SERPL-SCNC: 132 MMOL/L (ref 136–145)
WBC # BLD AUTO: 8.8 K/MM3 (ref 4–10)

## 2023-02-06 PROCEDURE — G0378 HOSPITAL OBSERVATION PER HR: HCPCS

## 2023-02-06 PROCEDURE — 3E033NZ INTRODUCTION OF ANALGESICS, HYPNOTICS, SEDATIVES INTO PERIPHERAL VEIN, PERCUTANEOUS APPROACH: ICD-10-PCS | Performed by: FAMILY MEDICINE

## 2023-02-06 PROCEDURE — 0DJ08ZZ INSPECTION OF UPPER INTESTINAL TRACT, VIA NATURAL OR ARTIFICIAL OPENING ENDOSCOPIC: ICD-10-PCS | Performed by: FAMILY MEDICINE

## 2023-02-06 PROCEDURE — 3E033GC INTRODUCTION OF OTHER THERAPEUTIC SUBSTANCE INTO PERIPHERAL VEIN, PERCUTANEOUS APPROACH: ICD-10-PCS | Performed by: FAMILY MEDICINE

## 2023-02-07 LAB
ANION GAP SERPL CALC-SCNC: 7 MMOL/L (ref 8–16)
APPEARANCE UR: CLEAR
APTT BLD: 29.5 SECONDS (ref 25.2–36.5)
BASOPHILS # BLD: 0.3 % (ref 0–2)
BILIRUB UR STRIP.AUTO-MCNC: NEGATIVE MG/DL
BUN SERPL-MCNC: 15 MG/DL (ref 7–18)
CALCIUM SERPL-MCNC: 9.3 MG/DL (ref 8.5–10.1)
CHLORIDE SERPL-SCNC: 102 MMOL/L (ref 98–107)
CO2 SERPL-SCNC: 27 MMOL/L (ref 21–32)
COLOR UR: YELLOW
CREAT SERPL-MCNC: 0.9 MG/DL (ref 0.55–1.3)
DEPRECATED RDW RBC AUTO: 13.9 % (ref 11.6–15.6)
EOSINOPHIL # BLD: 0.9 % (ref 0–4.5)
GLUCOSE SERPL-MCNC: 116 MG/DL (ref 74–106)
HCT VFR BLD CALC: 38.6 % (ref 32.4–45.2)
HGB BLD-MCNC: 13.3 GM/DL (ref 10.7–15.3)
INR BLD: 1.13 (ref 0.83–1.09)
KETONES UR QL STRIP: NEGATIVE
LEUKOCYTE ESTERASE UR QL STRIP.AUTO: NEGATIVE
LYMPHOCYTES # BLD: 36 % (ref 8–40)
MAGNESIUM SERPL-MCNC: 2.4 MG/DL (ref 1.8–2.4)
MCH RBC QN AUTO: 29.8 PG (ref 25.7–33.7)
MCHC RBC AUTO-ENTMCNC: 34.4 G/DL (ref 32–36)
MCV RBC: 86.7 FL (ref 80–96)
MONOCYTES # BLD AUTO: 8.2 % (ref 3.8–10.2)
NEUTROPHILS # BLD: 54.6 % (ref 42.8–82.8)
NITRITE UR QL STRIP: NEGATIVE
PH UR: 7.5 [PH] (ref 5–8)
PHOSPHATE SERPL-MCNC: 4.7 MG/DL (ref 2.5–4.9)
PLATELET # BLD AUTO: 268 10^3/UL (ref 134–434)
PMV BLD: 8.4 FL (ref 7.5–11.1)
PROT UR QL STRIP: NEGATIVE
PROT UR QL STRIP: NEGATIVE
PT PNL PPP: 13.1 SEC (ref 9.7–13)
RBC # BLD AUTO: 4.46 M/MM3 (ref 3.6–5.2)
SODIUM SERPL-SCNC: 135 MMOL/L (ref 136–145)
SP GR UR: 1.01 (ref 1.01–1.03)
UROBILINOGEN UR STRIP-MCNC: 0.2 MG/DL (ref 0.2–1)
WBC # BLD AUTO: 6.7 K/MM3 (ref 4–10)

## 2023-02-07 RX ADMIN — PANTOPRAZOLE SODIUM SCH: 40 TABLET, DELAYED RELEASE ORAL at 13:16

## 2023-02-07 RX ADMIN — LOSARTAN POTASSIUM SCH MG: 50 TABLET, FILM COATED ORAL at 09:12

## 2023-02-07 RX ADMIN — LEVOTHYROXINE SODIUM SCH MCG: 25 TABLET ORAL at 09:12

## 2023-02-07 RX ADMIN — HYDROCHLOROTHIAZIDE SCH MG: 25 TABLET ORAL at 09:12

## 2023-02-07 RX ADMIN — AMLODIPINE BESYLATE SCH MG: 5 TABLET ORAL at 09:12

## 2023-02-07 RX ADMIN — POLYETHYLENE GLYCOL 3350 SCH GM: 17 POWDER, FOR SOLUTION ORAL at 22:46

## 2023-02-07 RX ADMIN — ATORVASTATIN CALCIUM SCH MG: 20 TABLET, FILM COATED ORAL at 22:46

## 2023-02-07 RX ADMIN — POLYETHYLENE GLYCOL 3350 SCH GM: 17 POWDER, FOR SOLUTION ORAL at 09:12

## 2023-02-07 RX ADMIN — POLYETHYLENE GLYCOL 3350 SCH GM: 17 POWDER, FOR SOLUTION ORAL at 16:27

## 2023-02-08 RX ADMIN — POLYETHYLENE GLYCOL 3350 SCH GM: 17 POWDER, FOR SOLUTION ORAL at 22:21

## 2023-02-08 RX ADMIN — HYDROCHLOROTHIAZIDE SCH MG: 25 TABLET ORAL at 09:00

## 2023-02-08 RX ADMIN — POLYETHYLENE GLYCOL 3350 SCH: 17 POWDER, FOR SOLUTION ORAL at 07:10

## 2023-02-08 RX ADMIN — LEVOTHYROXINE SODIUM SCH MCG: 25 TABLET ORAL at 07:09

## 2023-02-08 RX ADMIN — LOSARTAN POTASSIUM SCH MG: 50 TABLET, FILM COATED ORAL at 09:00

## 2023-02-08 RX ADMIN — PANTOPRAZOLE SODIUM SCH MG: 40 TABLET, DELAYED RELEASE ORAL at 09:00

## 2023-02-08 RX ADMIN — POLYETHYLENE GLYCOL 3350 SCH: 17 POWDER, FOR SOLUTION ORAL at 14:00

## 2023-02-08 RX ADMIN — AMLODIPINE BESYLATE SCH MG: 5 TABLET ORAL at 09:28

## 2023-02-08 RX ADMIN — ATORVASTATIN CALCIUM SCH MG: 20 TABLET, FILM COATED ORAL at 22:21

## 2023-02-09 VITALS
TEMPERATURE: 98 F | DIASTOLIC BLOOD PRESSURE: 66 MMHG | HEART RATE: 79 BPM | SYSTOLIC BLOOD PRESSURE: 110 MMHG | RESPIRATION RATE: 20 BRPM

## 2023-02-09 RX ADMIN — POLYETHYLENE GLYCOL 3350 SCH GM: 17 POWDER, FOR SOLUTION ORAL at 06:02

## 2023-02-09 RX ADMIN — HYDROCHLOROTHIAZIDE SCH MG: 25 TABLET ORAL at 10:21

## 2023-02-09 RX ADMIN — POLYETHYLENE GLYCOL 3350 SCH GM: 17 POWDER, FOR SOLUTION ORAL at 14:11

## 2023-02-09 RX ADMIN — LOSARTAN POTASSIUM SCH MG: 50 TABLET, FILM COATED ORAL at 10:21

## 2023-02-09 RX ADMIN — LEVOTHYROXINE SODIUM SCH MCG: 25 TABLET ORAL at 06:02

## 2023-02-09 RX ADMIN — PANTOPRAZOLE SODIUM SCH MG: 40 TABLET, DELAYED RELEASE ORAL at 10:21

## 2023-02-28 ENCOUNTER — HOSPITAL ENCOUNTER (OUTPATIENT)
Dept: HOSPITAL 74 - JASU-ENDO | Age: 78
Discharge: HOME | End: 2023-02-28
Attending: INTERNAL MEDICINE
Payer: COMMERCIAL

## 2023-02-28 VITALS — SYSTOLIC BLOOD PRESSURE: 134 MMHG | RESPIRATION RATE: 17 BRPM | DIASTOLIC BLOOD PRESSURE: 53 MMHG | HEART RATE: 56 BPM

## 2023-02-28 VITALS — TEMPERATURE: 98 F

## 2023-02-28 VITALS — BODY MASS INDEX: 26.6 KG/M2

## 2023-02-28 DIAGNOSIS — K57.30: Primary | ICD-10-CM

## 2023-02-28 DIAGNOSIS — K64.8: ICD-10-CM

## 2023-02-28 PROCEDURE — 0DJD8ZZ INSPECTION OF LOWER INTESTINAL TRACT, VIA NATURAL OR ARTIFICIAL OPENING ENDOSCOPIC: ICD-10-PCS | Performed by: INTERNAL MEDICINE

## 2023-04-27 ENCOUNTER — HOSPITAL ENCOUNTER (EMERGENCY)
Dept: HOSPITAL 74 - JER | Age: 78
Discharge: HOME | End: 2023-04-27
Payer: COMMERCIAL

## 2023-04-27 VITALS — HEART RATE: 78 BPM | SYSTOLIC BLOOD PRESSURE: 145 MMHG | RESPIRATION RATE: 15 BRPM | DIASTOLIC BLOOD PRESSURE: 71 MMHG

## 2023-04-27 VITALS — BODY MASS INDEX: 29.2 KG/M2

## 2023-04-27 VITALS — TEMPERATURE: 97.8 F

## 2023-04-27 DIAGNOSIS — Z20.822: ICD-10-CM

## 2023-04-27 DIAGNOSIS — R11.10: ICD-10-CM

## 2023-04-27 DIAGNOSIS — R10.13: Primary | ICD-10-CM

## 2023-04-27 LAB
ALBUMIN SERPL-MCNC: 3.9 G/DL (ref 3.4–5)
ALP SERPL-CCNC: 61 U/L (ref 45–117)
ALT SERPL-CCNC: 20 U/L (ref 13–61)
ANION GAP SERPL CALC-SCNC: 9 MMOL/L (ref 8–16)
APPEARANCE UR: CLEAR
AST SERPL-CCNC: 19 U/L (ref 15–37)
BILIRUB SERPL-MCNC: 0.9 MG/DL (ref 0.2–1)
BILIRUB UR STRIP.AUTO-MCNC: NEGATIVE MG/DL
BUN SERPL-MCNC: 8.9 MG/DL (ref 7–18)
CALCIUM SERPL-MCNC: 9.4 MG/DL (ref 8.5–10.1)
CHLORIDE SERPL-SCNC: 97 MMOL/L (ref 98–107)
CO2 SERPL-SCNC: 25 MMOL/L (ref 21–32)
COLOR UR: YELLOW
CREAT SERPL-MCNC: 0.7 MG/DL (ref 0.55–1.3)
DEPRECATED RDW RBC AUTO: 13.3 % (ref 11.6–15.6)
GLUCOSE SERPL-MCNC: 124 MG/DL (ref 74–106)
HCT VFR BLD CALC: 33.8 % (ref 32.4–45.2)
HGB BLD-MCNC: 12.3 GM/DL (ref 10.7–15.3)
KETONES UR QL STRIP: NEGATIVE
LEUKOCYTE ESTERASE UR QL STRIP.AUTO: NEGATIVE
LIPASE SERPL-CCNC: 131 U/L (ref 73–393)
MAGNESIUM SERPL-MCNC: 2.1 MG/DL (ref 1.8–2.4)
MCH RBC QN AUTO: 30.7 PG (ref 25.7–33.7)
MCHC RBC AUTO-ENTMCNC: 36.3 G/DL (ref 32–36)
MCV RBC: 84.4 FL (ref 80–96)
NITRITE UR QL STRIP: NEGATIVE
PH UR: 7 [PH] (ref 5–8)
PLATELET # BLD AUTO: 327 10^3/UL (ref 134–434)
PMV BLD: 8.6 FL (ref 7.5–11.1)
PROT SERPL-MCNC: 7.6 G/DL (ref 6.4–8.2)
PROT UR QL STRIP: NEGATIVE
PROT UR QL STRIP: NEGATIVE
RBC # BLD AUTO: 4 M/MM3 (ref 3.6–5.2)
SODIUM SERPL-SCNC: 132 MMOL/L (ref 136–145)
SP GR UR: 1.01 (ref 1.01–1.03)
UROBILINOGEN UR STRIP-MCNC: 0.2 MG/DL (ref 0.2–1)
WBC # BLD AUTO: 6.4 K/MM3 (ref 4–10)

## 2023-04-27 PROCEDURE — 3E033GC INTRODUCTION OF OTHER THERAPEUTIC SUBSTANCE INTO PERIPHERAL VEIN, PERCUTANEOUS APPROACH: ICD-10-PCS

## 2023-04-27 PROCEDURE — 3E033NZ INTRODUCTION OF ANALGESICS, HYPNOTICS, SEDATIVES INTO PERIPHERAL VEIN, PERCUTANEOUS APPROACH: ICD-10-PCS

## 2023-12-16 ENCOUNTER — HOSPITAL ENCOUNTER (EMERGENCY)
Dept: HOSPITAL 74 - JER | Age: 78
Discharge: HOME | End: 2023-12-16
Payer: COMMERCIAL

## 2023-12-16 VITALS
HEART RATE: 57 BPM | TEMPERATURE: 98.1 F | DIASTOLIC BLOOD PRESSURE: 78 MMHG | RESPIRATION RATE: 16 BRPM | SYSTOLIC BLOOD PRESSURE: 111 MMHG

## 2023-12-16 VITALS — BODY MASS INDEX: 27 KG/M2

## 2023-12-16 DIAGNOSIS — R05.9: Primary | ICD-10-CM

## 2023-12-16 LAB
ALBUMIN SERPL-MCNC: 3.8 G/DL (ref 3.4–5)
ALP SERPL-CCNC: 62 U/L (ref 45–117)
ALT SERPL-CCNC: 17 U/L (ref 13–61)
ANION GAP SERPL CALC-SCNC: 7 MMOL/L (ref 4–13)
AST SERPL-CCNC: 15 U/L (ref 15–37)
BASOPHILS # BLD: 0.4 % (ref 0–2)
BILIRUB SERPL-MCNC: 0.7 MG/DL (ref 0.2–1)
BUN SERPL-MCNC: 16 MG/DL (ref 7–18)
CALCIUM SERPL-MCNC: 8.9 MG/DL (ref 8.5–10.1)
CHLORIDE SERPL-SCNC: 102 MMOL/L (ref 98–107)
CO2 SERPL-SCNC: 25 MMOL/L (ref 21–32)
CREAT SERPL-MCNC: 0.7 MG/DL (ref 0.55–1.3)
DEPRECATED RDW RBC AUTO: 13.1 % (ref 11.6–15.6)
EOSINOPHIL # BLD: 0.7 % (ref 0–4.5)
GLUCOSE SERPL-MCNC: 121 MG/DL (ref 74–106)
HCT VFR BLD CALC: 36.4 % (ref 32.4–45.2)
HGB BLD-MCNC: 12.5 GM/DL (ref 10.7–15.3)
LYMPHOCYTES # BLD: 16.3 % (ref 8–40)
MCH RBC QN AUTO: 30.4 PG (ref 25.7–33.7)
MCHC RBC AUTO-ENTMCNC: 34.5 G/DL (ref 32–36)
MCV RBC: 88.1 FL (ref 80–96)
MONOCYTES # BLD AUTO: 3.7 % (ref 3.8–10.2)
NEUTROPHILS # BLD: 78.9 % (ref 42.8–82.8)
PLATELET # BLD AUTO: 309 10^3/UL (ref 134–434)
PMV BLD: 7.6 FL (ref 7.5–11.1)
POTASSIUM SERPLBLD-SCNC: 4.1 MMOL/L (ref 3.5–5.1)
PROT SERPL-MCNC: 7.6 G/DL (ref 6.4–8.2)
RBC # BLD AUTO: 4.13 M/MM3 (ref 3.6–5.2)
SODIUM SERPL-SCNC: 134 MMOL/L (ref 136–145)
WBC # BLD AUTO: 10.1 K/MM3 (ref 4–10)

## 2024-02-13 ENCOUNTER — HOSPITAL ENCOUNTER (EMERGENCY)
Dept: HOSPITAL 74 - JERFT | Age: 79
Discharge: HOME | End: 2024-02-13
Payer: COMMERCIAL

## 2024-02-13 VITALS
RESPIRATION RATE: 18 BRPM | HEART RATE: 54 BPM | DIASTOLIC BLOOD PRESSURE: 45 MMHG | TEMPERATURE: 98.1 F | SYSTOLIC BLOOD PRESSURE: 126 MMHG

## 2024-02-13 VITALS — BODY MASS INDEX: 27 KG/M2

## 2024-02-13 DIAGNOSIS — R05.9: Primary | ICD-10-CM

## 2024-02-13 DIAGNOSIS — Z20.822: ICD-10-CM

## 2024-12-18 ENCOUNTER — HOSPITAL ENCOUNTER (EMERGENCY)
Dept: HOSPITAL 74 - JER | Age: 79
Discharge: HOME | End: 2024-12-18
Payer: COMMERCIAL

## 2024-12-18 VITALS — BODY MASS INDEX: 29.1 KG/M2

## 2024-12-18 VITALS
HEART RATE: 60 BPM | TEMPERATURE: 98.2 F | DIASTOLIC BLOOD PRESSURE: 54 MMHG | SYSTOLIC BLOOD PRESSURE: 124 MMHG | RESPIRATION RATE: 16 BRPM

## 2024-12-18 DIAGNOSIS — R50.9: ICD-10-CM

## 2024-12-18 DIAGNOSIS — R42: ICD-10-CM

## 2024-12-18 DIAGNOSIS — R11.0: ICD-10-CM

## 2024-12-18 DIAGNOSIS — R10.84: Primary | ICD-10-CM

## 2024-12-18 LAB
ALBUMIN SERPL-MCNC: 3.5 G/DL (ref 3.4–5)
ALP SERPL-CCNC: 56 U/L (ref 45–117)
ALT SERPL-CCNC: 20 U/L (ref 13–61)
ANION GAP SERPL CALC-SCNC: 8 MMOL/L (ref 4–13)
AST SERPL-CCNC: 20 U/L (ref 15–37)
BASOPHILS # BLD: 0.3 % (ref 0–2)
BILIRUB SERPL-MCNC: 0.6 MG/DL (ref 0.2–1)
BUN SERPL-MCNC: 18.5 MG/DL (ref 7–18)
CALCIUM SERPL-MCNC: 9.2 MG/DL (ref 8.5–10.1)
CHLORIDE SERPL-SCNC: 103 MMOL/L (ref 98–107)
CO2 SERPL-SCNC: 24 MMOL/L (ref 21–32)
CREAT SERPL-MCNC: 0.9 MG/DL (ref 0.55–1.3)
DEPRECATED RDW RBC AUTO: 13.5 % (ref 11.6–15.6)
EOSINOPHIL # BLD: 0.6 % (ref 0–4.5)
GLUCOSE SERPL-MCNC: 108 MG/DL (ref 74–106)
HCT VFR BLD CALC: 35.5 % (ref 32.4–45.2)
HGB BLD-MCNC: 12.2 GM/DL (ref 10.7–15.3)
LYMPHOCYTES # BLD: 29.6 % (ref 8–40)
MCH RBC QN AUTO: 30.6 PG (ref 25.7–33.7)
MCHC RBC AUTO-ENTMCNC: 34.3 G/DL (ref 32–36)
MCV RBC: 89.1 FL (ref 80–96)
MONOCYTES # BLD AUTO: 8.5 % (ref 3.8–10.2)
NEUTROPHILS # BLD: 61 % (ref 42.8–82.8)
PLATELET # BLD AUTO: 306 10^3/UL (ref 134–434)
PMV BLD: 7.3 FL (ref 7.5–11.1)
POTASSIUM SERPLBLD-SCNC: 4.1 MMOL/L (ref 3.5–5.1)
PROT SERPL-MCNC: 7.2 G/DL (ref 6.4–8.2)
RBC # BLD AUTO: 3.99 M/MM3 (ref 3.6–5.2)
SODIUM SERPL-SCNC: 136 MMOL/L (ref 136–145)
WBC # BLD AUTO: 8.1 K/MM3 (ref 4–10)

## 2024-12-18 PROCEDURE — 3E033NZ INTRODUCTION OF ANALGESICS, HYPNOTICS, SEDATIVES INTO PERIPHERAL VEIN, PERCUTANEOUS APPROACH: ICD-10-PCS

## 2024-12-18 PROCEDURE — 3E033GC INTRODUCTION OF OTHER THERAPEUTIC SUBSTANCE INTO PERIPHERAL VEIN, PERCUTANEOUS APPROACH: ICD-10-PCS

## 2024-12-18 RX ADMIN — ALUMINUM HYDROXIDE, MAGNESIUM HYDROXIDE, AND SIMETHICONE ONE ML: 200; 200; 20 SUSPENSION ORAL at 19:18

## 2024-12-18 RX ADMIN — ONDANSETRON ONE MG: 2 INJECTION INTRAMUSCULAR; INTRAVENOUS at 19:19

## 2024-12-18 RX ADMIN — ACETAMINOPHEN ONE MG: 10 INJECTION, SOLUTION INTRAVENOUS at 19:18

## 2024-12-18 RX ADMIN — FAMOTIDINE ONE MLS/HR: 20 INJECTION, SOLUTION INTRAVENOUS at 19:18
